# Patient Record
Sex: FEMALE | Race: BLACK OR AFRICAN AMERICAN | Employment: FULL TIME | URBAN - METROPOLITAN AREA
[De-identification: names, ages, dates, MRNs, and addresses within clinical notes are randomized per-mention and may not be internally consistent; named-entity substitution may affect disease eponyms.]

---

## 2021-12-09 ENCOUNTER — HOSPITAL ENCOUNTER (EMERGENCY)
Age: 21
Discharge: HOME | End: 2021-12-09
Attending: EMERGENCY MEDICINE
Payer: COMMERCIAL

## 2021-12-09 VITALS
OXYGEN SATURATION: 99 % | RESPIRATION RATE: 18 BRPM | BODY MASS INDEX: 35 KG/M2 | DIASTOLIC BLOOD PRESSURE: 91 MMHG | WEIGHT: 205 LBS | HEART RATE: 100 BPM | SYSTOLIC BLOOD PRESSURE: 144 MMHG | TEMPERATURE: 97.8 F | HEIGHT: 64 IN

## 2021-12-09 DIAGNOSIS — R10.9 ABDOMINAL CRAMPING: Primary | ICD-10-CM

## 2021-12-09 DIAGNOSIS — R11.0 NAUSEA: ICD-10-CM

## 2021-12-09 LAB — GLUCOSE BLD STRIP.AUTO-MCNC: 110 MG/DL (ref 70–110)

## 2021-12-09 PROCEDURE — 99282 EMERGENCY DEPT VISIT SF MDM: CPT

## 2021-12-09 PROCEDURE — 82962 GLUCOSE BLOOD TEST: CPT

## 2021-12-09 NOTE — Clinical Note
[facility] [place]  [facility]  [address]  [facility] [address] [phone]    Work/School Note    Date: 12/9/2021    To Whom It May concern:    Trinh Frazier was seen and treated today in the emergency room by the following provider(s):  Attending Provider: Jackie Winter MD.      Trinh Frazier is excused from work/school on 12/09/21 and 12/10/21. She is medically clear to return to work/school on 12/11/2021.        Sincerely,          Mike Flowers MD

## 2021-12-09 NOTE — Clinical Note
[facility] [place]  [facility]  [address] [facility] [place] [facility] [id number] [phone]    Work/School Note    Date: 12/9/2021    To Whom It May concern:    Arian Parekh was seen and treated today in the emergency room by the following provider(s):  Attending Provider: Sarah Solis MD.      Arian Parekh is excused from work/school on 12/09/21 and 12/10/21. She is medically clear to return to work/school on 12/11/2021.        Sincerely,          Digna Stevenson RN

## 2021-12-10 NOTE — ED NOTES
Pt c/o reduced vision x 1 month, lack of energy and intermittent confusion. Pt alert and oriented at present. Pt states she thinks this is due to blood sugar fluctuations.  Pt has family history of diabetes

## 2021-12-10 NOTE — ED PROVIDER NOTES
EMERGENCY DEPARTMENT HISTORY AND PHYSICAL EXAM    Date: 12/9/2021  Patient Name: Chikis Alfaro    History of Presenting Illness     Chief Complaint   Patient presents with    Other         History Provided By: Patient    Additional History (Context):   10:02 PM  Chikis Alfaro is a 24 y.o. female with PMHX of asthma, concussion, anxiety and self cutting who presents to the emergency department C/O blood sugar problems. She states she has had a hard time focusing and blurry vision is been off and on for the month. Also feels like she has lack of energy. She has some family history of diabetes and limited blood sugar check. She has no fevers chills chest pain shortness of breath nausea vomiting or diarrhea. She is chronically overweight and has been ongoing problem for her. Social History  She no smoking drinking or drugs    Family History  Mother with high blood pressure and heart disease as well as asthma. Maternal grandmother had heart disease. PCP: Ellis Jenkins MD    Current Outpatient Medications   Medication Sig Dispense Refill    acetaminophen (TYLENOL) 500 mg tablet Take 1 Tab by mouth every six (6) hours as needed for Pain. 20 Tab 0       Past History     Past Medical History:  Past Medical History:   Diagnosis Date    Asthma     Concussion     Ill-defined condition     cutting self       Past Surgical History:  No past surgical history on file. Family History:  No family history on file. Social History:  Social History     Tobacco Use    Smoking status: Never Smoker    Smokeless tobacco: Not on file   Substance Use Topics    Alcohol use: No    Drug use: No       Allergies: Allergies   Allergen Reactions    Latex Unknown (comments)     Had positive allergy test to this         Review of Systems   Review of Systems   Constitutional: Positive for activity change (Decreased energy). Eyes: Positive for visual disturbance.    Neurological:        Periods of confusion or lack of focus   All other systems reviewed and are negative. Physical Exam     Vitals:    12/09/21 2030   BP: (!) 144/91   Pulse: 100   Resp: 18   Temp: 97.8 °F (36.6 °C)   SpO2: 99%   Weight: 93 kg (205 lb)   Height: 5' 4\" (1.626 m)     Physical Exam  Vitals and nursing note reviewed. Constitutional:       General: She is not in acute distress. Appearance: She is well-developed. She is obese. She is not ill-appearing, toxic-appearing or diaphoretic. HENT:      Head: Normocephalic and atraumatic. Eyes:      General: No scleral icterus. Extraocular Movements:      Right eye: Normal extraocular motion. Left eye: Normal extraocular motion. Conjunctiva/sclera: Conjunctivae normal.      Right eye: No hemorrhage. Left eye: No hemorrhage. Pupils: Pupils are equal, round, and reactive to light. Funduscopic exam:     Right eye: No papilledema. Left eye: No papilledema. Neck:      Trachea: No tracheal deviation. Cardiovascular:      Rate and Rhythm: Normal rate and regular rhythm. Heart sounds: Normal heart sounds. Pulmonary:      Effort: Pulmonary effort is normal. No respiratory distress. Breath sounds: Normal breath sounds. No stridor. Abdominal:      General: Abdomen is protuberant. Bowel sounds are normal. There is no distension. Palpations: Abdomen is soft. Tenderness: There is no abdominal tenderness. There is no rebound. Musculoskeletal:         General: No tenderness. Normal range of motion. Cervical back: Normal range of motion and neck supple. Comments: Grossly unremarkable without abnormalities   Skin:     General: Skin is warm and dry. Capillary Refill: Capillary refill takes less than 2 seconds. Findings: No erythema or rash. Neurological:      Mental Status: She is alert and oriented to person, place, and time. Cranial Nerves: No cranial nerve deficit. Motor: No weakness.    Psychiatric:         Mood and Affect: Mood normal.         Behavior: Behavior normal.         Thought Content: Thought content normal.         Judgment: Judgment normal.       Diagnostic Study Results     Labs -  Recent Results (from the past 24 hour(s))   GLUCOSE, POC    Collection Time: 12/09/21  8:39 PM   Result Value Ref Range    Glucose (POC) 110 70 - 110 mg/dL        Radiologic Studies -   No orders to display     CT Results  (Last 48 hours)    None        CXR Results  (Last 48 hours)    None          Medications given in the ED-  Medications - No data to display      Medical Decision Making   I am the first provider for this patient. I reviewed the vital signs, available nursing notes, past medical history, past surgical history, family history and social history. Vital Signs-Reviewed the patient's vital signs. Pulse Oximetry Analysis - 99% on room air      Records Reviewed: NURSING NOTES AND PREVIOUS MEDICAL RECORDS    Provider Notes (Medical Decision Making):   Obese young female with generalized fatigue malaise been ongoing issue with her. Her primary concern is blood sugar level long-term issues creatinine electrolytes, anemia, chronic fatigue with history of depression she states she will follow neurological for further assessment. Procedures:  Procedures    ED Course:   10:02 PM: Initial assessment performed. The patients presenting problems have been discussed, and they are in agreement with the care plan formulated and outlined with them. I have encouraged them to ask questions as they arise throughout their visit. Diagnosis and Disposition       DISCHARGE NOTE:  10:34 PM  Pablo Dhillon's  results have been reviewed with her. She has been counseled regarding her diagnosis, treatment, and plan. She verbally conveys understanding and agreement of the signs, symptoms, diagnosis, treatment and prognosis and additionally agrees to follow up as discussed.   She also agrees with the care-plan and conveys that all of her questions have been answered. I have also provided discharge instructions for her that include: educational information regarding their diagnosis and treatment, and list of reasons why they would want to return to the ED prior to their follow-up appointment, should her condition change. She has been provided with education for proper emergency department utilization. CLINICAL IMPRESSION:    1. Abdominal cramping    2. Nausea        PLAN:  1. D/C Home  2. Current Discharge Medication List        3. Follow-up Information     Follow up With Specialties Details Why Contact Info    Jeremias Menjivar MD Family Medicine Schedule an appointment as soon as possible for a visit  As needed [address]  [phone]          _______________________________    This note was partially transcribed via voice recognition software. Although efforts have been made to catch any discrepancies, it may contain sound alike words, grammatical errors, or nonsensical words.

## 2022-08-16 ENCOUNTER — HOSPITAL ENCOUNTER (EMERGENCY)
Age: 22
Discharge: HOME | End: 2022-08-16
Attending: EMERGENCY MEDICINE
Payer: COMMERCIAL

## 2022-08-16 VITALS
WEIGHT: 225 LBS | HEART RATE: 88 BPM | DIASTOLIC BLOOD PRESSURE: 81 MMHG | SYSTOLIC BLOOD PRESSURE: 128 MMHG | HEIGHT: 63 IN | OXYGEN SATURATION: 100 % | RESPIRATION RATE: 18 BRPM | BODY MASS INDEX: 39.87 KG/M2 | TEMPERATURE: 97.1 F

## 2022-08-16 DIAGNOSIS — N61.0 CELLULITIS OF FEMALE BREAST: Primary | ICD-10-CM

## 2022-08-16 PROCEDURE — 99283 EMERGENCY DEPT VISIT LOW MDM: CPT

## 2022-08-16 RX ORDER — CLINDAMYCIN HYDROCHLORIDE 300 MG/1
300 CAPSULE ORAL 3 TIMES DAILY
Qty: 30 CAPSULE | Refills: 0 | Status: SHIPPED | OUTPATIENT
Start: 2022-08-16 | End: 2022-08-26

## 2022-08-17 NOTE — ED PROVIDER NOTES
EMERGENCY DEPARTMENT HISTORY AND PHYSICAL EXAM    Date: 8/16/2022  Patient Name: Emmy Mcghee    History of Presenting Illness     Time Seen:9:46 PM    Chief Complaint   Patient presents with    Nipple Problem       History Provided By: Patient    Additional History (Context): Emmy Mcghee is a 20 y.o. female who presents to the emergency room with c/o right breast/nipple infection. Symptoms for the last couple days. Patient has noticed some purulent drainage coming from the nipple area. Also some tenderness and some swelling. She has a nipple ring in place. Its been there since May of this year (2022). Patient read online about keeping the area clean. Doing soaks/compresses. However continues to get worse. Finally opted to come into the emergency room. Denies any fever. PCP: None    Current Outpatient Medications   Medication Sig Dispense Refill    clindamycin (CLEOCIN) 300 mg capsule Take 1 Capsule by mouth three (3) times daily for 10 days. 30 Capsule 0    acetaminophen (TYLENOL) 500 mg tablet Take 1 Tab by mouth every six (6) hours as needed for Pain. 20 Tab 0       Past History     Past Medical History:  Past Medical History:   Diagnosis Date    Asthma     Concussion     Ill-defined condition     cutting self       Past Surgical History:  No past surgical history on file. Family History:  No family history on file. Social History:  Social History     Tobacco Use    Smoking status: Never   Substance Use Topics    Alcohol use: No    Drug use: No       Allergies: Allergies   Allergen Reactions    Latex Unknown (comments)     Had positive allergy test to this         Review of Systems   Review of Systems   Constitutional:  Negative for chills and fever. Cardiovascular:         Breast tenderness/swelling   Skin:  Positive for wound. All other systems reviewed and are negative.     Physical Exam     Vitals:    08/16/22 2132   BP: 128/81   Pulse: 88   Resp: 18   Temp: 97.1 °F (36.2 °C)   SpO2: 100%   Weight: 102.1 kg (225 lb)   Height: 5' 3\" (1.6 m)     Physical Exam  Vitals and nursing note reviewed. Exam conducted with a chaperone present. Constitutional:       Appearance: Normal appearance. She is well-developed, well-groomed and overweight. She is not ill-appearing. Comments: 79-year-old female no apparent distress. Vital signs are stable. Cooperative. Cardiovascular:      Rate and Rhythm: Normal rate and regular rhythm. Heart sounds: Normal heart sounds. Pulmonary:      Effort: Pulmonary effort is normal.      Breath sounds: Normal breath sounds. Chest:      Chest wall: Swelling and tenderness present. No edema. Comments: Right breast - assisted by CHRISTIANO Bowman. Patient has a small soft tissue infection related to the right side of her nipple. She has a bar piercing in place through the nipple. Along the right side of the nipple is where the inflammation is located. There is no active drainage or bleeding. Mildly tender. There is no extension of the infection to the breast itself. Skin:     General: Skin is warm and dry. Neurological:      Mental Status: She is alert and oriented to person, place, and time. Psychiatric:         Behavior: Behavior is cooperative. Nursing note and vitals reviewed      Diagnostic Study Results     Labs -   No results found for this or any previous visit (from the past 24 hour(s)). Radiologic Studies   No orders to display     CT Results  (Last 48 hours)      None          CXR Results  (Last 48 hours)      None              Medical Decision Making   I am the first provider for this patient. I reviewed the vital signs, available nursing notes, past medical history, past surgical history, family history and social history. Vital Signs-Reviewed the patient's vital signs.     Records Reviewed: Nursing Notes    DDX:  Nipple infection secondary to piercing    Procedures:  Procedures    ED Course:   Initial assessment performed. The patients presenting problems have been discussed, and they are in agreement with the care plan formulated and outlined with them. I have encouraged them to ask questions as they arise throughout their visit. ED Physician Discussion Note:   Patient will be placed on oral antibiotic  Advised patient to remove nipple piercing. Very well could be inflammation related to reaction to the metal  Continue moist/warm compresses  Tylenol/Motrin for pain  Topical antibiotic like Neosporin/bacitracin        Jahaira VERDIN PA-C, am the primary clinician on record for this patient. Diagnosis and Disposition       DISCHARGE NOTE:  Fidel Dhillon's  results have been reviewed with her. She has been counseled regarding her diagnosis, treatment, and plan. She verbally conveys understanding and agreement of the signs, symptoms, diagnosis, treatment and prognosis and additionally agrees to follow up as discussed. She also agrees with the care-plan and conveys that all of her questions have been answered. I have also provided discharge instructions for her that include: educational information regarding their diagnosis and treatment, and list of reasons why they would want to return to the ED prior to their follow-up appointment, should her condition change. She has been provided with education for proper emergency department utilization. CLINICAL IMPRESSION:    1. Cellulitis of female breast        PLAN:  1. D/C Home  2. Discharge Medication List as of 8/16/2022  9:50 PM        START taking these medications    Details   clindamycin (CLEOCIN) 300 mg capsule Take 1 Capsule by mouth three (3) times daily for 10 days. , Normal, Disp-30 Capsule, R-0           CONTINUE these medications which have NOT CHANGED    Details   acetaminophen (TYLENOL) 500 mg tablet Take 1 Tab by mouth every six (6) hours as needed for Pain., Print, Disp-20 Tab, R-0           3.    Follow-up Information       Follow up With Specialties Details Why Contact Info    Primary care provider   Call your primary care provider for follow-up care next week     [facility] EMERGENCY DEPT Emergency Medicine  If symptoms worsen, As needed [address] [place] [id number]  [phone]          ____________________________________     Please note that this dictation was completed with [employer], the computer voice recognition software. Quite often unanticipated grammatical, syntax, homophones, and other interpretive errors are inadvertently transcribed by the computer software. Please disregard these errors. Please excuse any errors that have escaped final proofreading.

## 2022-08-17 NOTE — ED TRIAGE NOTES
Pt arrives to ed reporting a possible right nipple infection. Pt noticed a week ago that her right nipple began to ooze pus.

## 2022-08-17 NOTE — DISCHARGE INSTRUCTIONS
Please remove your nipple ring  Recommend applying topical antibiotic ointment like bacitracin or Neosporin  Use Tylenol/Motrin for pain  Continue moist compresses or soaks  Antibiotic as prescribed  Worse?   Return to ER